# Patient Record
(demographics unavailable — no encounter records)

---

## 2024-11-12 NOTE — PLAN
[Provision of National Suicide Prevention Lifeline 6-225-319-TALK (7671)] : Provision of national suicide prevention lifeline 8-528-546-talk (8392) [Family] : family [Contact was Attempted] : contact was attempted [None on Record] : none on record [TextBox_9] : linkage to behavioral therapist with parent training. referral to peds neuro 12/2 12:30pm [place] [facility]. [place] given for school to fill out. secure email sent to school to inquire whether they run a social skills group.  [TextBox_11] : some psychopharm education about stimulants provided to the family.  [TextBox_13] :  no safety concerns at this time. no guns at home. family should call 911 or go to nearest ER or any acute safety concerns. [TextBox_26] : [email or web address] secure email sent to school. consent signed
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
## 2024-11-12 NOTE — REASON FOR VISIT
[Behavioral Health Urgent Care Assessment] : a behavioral health urgent care assessment [School] : school [Patient] : patient [Self] : alone [Parents] : with parents [TextBox_17] : behavioral issues

## 2024-12-03 NOTE — CONSULT LETTER
[Dear  ___] : Dear  [unfilled], [Consult Letter:] : I had the pleasure of evaluating your patient, [unfilled]. [Consult Closing:] : Thank you very much for allowing me to participate in the care of this patient.  If you have any questions, please do not hesitate to contact me. [Sincerely,] : Sincerely, [FreeTextEntry3] : YARI Collins-C Certified Family Nurse Practitioner Pediatric Neurology [facility] [address] [place] [address] Tel: [phone]. Fax: [phone]

## 2024-12-03 NOTE — ASSESSMENT
[FreeTextEntry1] :  DUSTIN is a 5 year old male presenting for initial evaluation of inattention/hyperactivity   DUSTIN is in an [employer]  classroom setting as a general education student with no services at this time. Parents and teachers are concerned with need for constant movement, inability to remain on or complete tasks, and difficulties with personal space. Oppositional behavior has been present at home and at school. No concerns for staring episodes, twitching, rapid eye blinking or seizure-like activity. Non focal neurological exam. Will proceed with ADHD evaluation using [place] forms, pending parent form, teacher form reviewed during today's visit.

## 2024-12-03 NOTE — PHYSICAL EXAM
[Well-appearing] : well-appearing [Normocephalic] : normocephalic [No dysmorphic facial features] : no dysmorphic facial features [Neck supple] : neck supple [Straight] : straight [No deformities] : no deformities [Alert] : alert [Well related, good eye contact] : well related, good eye contact [Conversant] : conversant [Normal speech and language] : normal speech and language [Follows instructions well] : follows instructions well [VFF] : VFF [Pupils reactive to light and accommodation] : pupils reactive to light and accommodation [Full extraocular movements] : full extraocular movements [Normal facial sensation to light touch] : normal facial sensation to light touch [No facial asymmetry or weakness] : no facial asymmetry or weakness [Gross hearing intact] : gross hearing intact [Equal palate elevation] : equal palate elevation [Good shoulder shrug] : good shoulder shrug [Normal tongue movement] : normal tongue movement [Midline tongue, no fasciculations] : midline tongue, no fasciculations [Normal axial and appendicular muscle tone] : normal axial and appendicular muscle tone [Gets up on table without difficulty] : gets up on table without difficulty [No pronator drift] : no pronator drift [Normal finger tapping and fine finger movements] : normal finger tapping and fine finger movements [No abnormal involuntary movements] : no abnormal involuntary movements [5/5 strength in proximal and distal muscles of arms and legs] : 5/5 strength in proximal and distal muscles of arms and legs [Walks and runs well] : walks and runs well [Able to do deep knee bend] : able to do deep knee bend [Able to walk on heels] : able to walk on heels [Able to walk on toes] : able to walk on toes [Knee jerks] : knee jerks [Localizes LT and temperature] : localizes LT and temperature [No dysmetria on FTNT] : no dysmetria on FTNT [Good walking balance] : good walking balance [Normal gait] : normal gait [Able to tandem well] : able to tandem well [Negative Romberg] : negative Romberg [de-identified] : Breathing even and unlabored

## 2024-12-03 NOTE — REASON FOR VISIT
[Initial Consultation] : an initial consultation for [Mother] : mother [FreeTextEntry2] : inattention, hyperactivity

## 2024-12-03 NOTE — PHYSICAL EXAM
[Well-appearing] : well-appearing [Normocephalic] : normocephalic [No dysmorphic facial features] : no dysmorphic facial features [Neck supple] : neck supple [Straight] : straight [No deformities] : no deformities [Alert] : alert [Well related, good eye contact] : well related, good eye contact [Conversant] : conversant [Normal speech and language] : normal speech and language [Follows instructions well] : follows instructions well [VFF] : VFF [Pupils reactive to light and accommodation] : pupils reactive to light and accommodation [Full extraocular movements] : full extraocular movements [Normal facial sensation to light touch] : normal facial sensation to light touch [No facial asymmetry or weakness] : no facial asymmetry or weakness [Gross hearing intact] : gross hearing intact [Equal palate elevation] : equal palate elevation [Good shoulder shrug] : good shoulder shrug [Normal tongue movement] : normal tongue movement [Midline tongue, no fasciculations] : midline tongue, no fasciculations [Normal axial and appendicular muscle tone] : normal axial and appendicular muscle tone [Gets up on table without difficulty] : gets up on table without difficulty [No pronator drift] : no pronator drift [Normal finger tapping and fine finger movements] : normal finger tapping and fine finger movements [No abnormal involuntary movements] : no abnormal involuntary movements [5/5 strength in proximal and distal muscles of arms and legs] : 5/5 strength in proximal and distal muscles of arms and legs [Walks and runs well] : walks and runs well [Able to do deep knee bend] : able to do deep knee bend [Able to walk on heels] : able to walk on heels [Able to walk on toes] : able to walk on toes [Knee jerks] : knee jerks [Localizes LT and temperature] : localizes LT and temperature [No dysmetria on FTNT] : no dysmetria on FTNT [Good walking balance] : good walking balance [Normal gait] : normal gait [Able to tandem well] : able to tandem well [Negative Romberg] : negative Romberg [de-identified] : Breathing even and unlabored
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
## 2024-12-03 NOTE — ASSESSMENT
[FreeTextEntry1] :  DUSTIN is a 5 year old male presenting for initial evaluation of inattention/hyperactivity   DUSTIN is in an [employer]  classroom setting as a general education student with no services at this time. Parents and teachers are concerned with need for constant movement, inability to remain on or complete tasks, and difficulties with personal space. Oppositional behavior has been present at home and at school. No concerns for staring episodes, twitching, rapid eye blinking or seizure-like activity. Non focal neurological exam. Will proceed with ADHD evaluation using [place] forms, pending parent form, teacher form reviewed during today's visit.

## 2024-12-03 NOTE — PLAN
[FreeTextEntry1] : [ ][place] questionnaires reviewed for teacher, pending parent form  [ ] Discussed use of Omega 3 fish oil [ ]Discussed use of medications as well as side effects if accommodations do not improve school performance [ ]Follow up once parent form is completed or obtained from  urgent care.  [ ] All questions and concerns addressed

## 2024-12-03 NOTE — CONSULT LETTER
[Dear  ___] : Dear  [unfilled], [Consult Letter:] : I had the pleasure of evaluating your patient, [unfilled]. [Consult Closing:] : Thank you very much for allowing me to participate in the care of this patient.  If you have any questions, please do not hesitate to contact me. [Sincerely,] : Sincerely, [FreeTextEntry3] : YARI Collins-C Certified Family Nurse Practitioner Pediatric Neurology [facility] [address] [place] [address] Tel: [phone]. Fax: [phone]

## 2024-12-03 NOTE — PLAN
[FreeTextEntry1] : [ ][place] questionnaires reviewed for teacher, pending parent form  [ ] Discussed use of Omega 3 fish oil [ ]Discussed use of medications as well as side effects if accommodations do not improve school performance [ ]Follow up once parent form is completed or obtained from  urgent care.  [ ] All questions and concerns addressed
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
## 2025-01-26 NOTE — ASSESSMENT
[FreeTextEntry1] :  DUSTIN is a 6 year old male presenting for f/u evaluation of inattention/hyperactivity   DUSTIN is in an [employer]  classroom setting as a general education student with no services at this time. Parents and teachers are concerned with need for constant movement, inability to remain on or complete tasks, and difficulties with personal space. Oppositional behavior has been present at home and at school. No concerns for staring episodes, twitching, rapid eye blinking or seizure-like activity. Non focal neurological exam. [place] forms reviewed, consistent with ADHD- Combined type with a component of ODD.

## 2025-01-26 NOTE — ASSESSMENT
[FreeTextEntry1] :  DUSTIN is a 6 year old male presenting for f/u evaluation of inattention/hyperactivity   DUSTIN is in an [employer]  classroom setting as a general education student with no services at this time. Parents and teachers are concerned with need for constant movement, inability to remain on or complete tasks, and difficulties with personal space. Oppositional behavior has been present at home and at school. No concerns for staring episodes, twitching, rapid eye blinking or seizure-like activity. Non focal neurological exam. [place] forms reviewed, consistent with ADHD- Combined type with a component of ODD.

## 2025-01-26 NOTE — PHYSICAL EXAM
[Well-appearing] : well-appearing [Normocephalic] : normocephalic [No dysmorphic facial features] : no dysmorphic facial features [Neck supple] : neck supple [Straight] : straight [No deformities] : no deformities [Alert] : alert [Well related, good eye contact] : well related, good eye contact [Conversant] : conversant [Normal speech and language] : normal speech and language [Follows instructions well] : follows instructions well [VFF] : VFF [Pupils reactive to light and accommodation] : pupils reactive to light and accommodation [Full extraocular movements] : full extraocular movements [Normal facial sensation to light touch] : normal facial sensation to light touch [No facial asymmetry or weakness] : no facial asymmetry or weakness [Gross hearing intact] : gross hearing intact [Equal palate elevation] : equal palate elevation [Good shoulder shrug] : good shoulder shrug [Normal tongue movement] : normal tongue movement [Midline tongue, no fasciculations] : midline tongue, no fasciculations [Normal axial and appendicular muscle tone] : normal axial and appendicular muscle tone [Gets up on table without difficulty] : gets up on table without difficulty [No pronator drift] : no pronator drift [Normal finger tapping and fine finger movements] : normal finger tapping and fine finger movements [No abnormal involuntary movements] : no abnormal involuntary movements [5/5 strength in proximal and distal muscles of arms and legs] : 5/5 strength in proximal and distal muscles of arms and legs [Walks and runs well] : walks and runs well [Able to do deep knee bend] : able to do deep knee bend [Able to walk on heels] : able to walk on heels [Able to walk on toes] : able to walk on toes [Knee jerks] : knee jerks [Localizes LT and temperature] : localizes LT and temperature [No dysmetria on FTNT] : no dysmetria on FTNT [Good walking balance] : good walking balance [Normal gait] : normal gait [Able to tandem well] : able to tandem well [Negative Romberg] : negative Romberg [de-identified] : Breathing even and unlabored

## 2025-01-26 NOTE — PHYSICAL EXAM
[Well-appearing] : well-appearing [Normocephalic] : normocephalic [No dysmorphic facial features] : no dysmorphic facial features [Neck supple] : neck supple [Straight] : straight [No deformities] : no deformities [Alert] : alert [Well related, good eye contact] : well related, good eye contact [Conversant] : conversant [Normal speech and language] : normal speech and language [Follows instructions well] : follows instructions well [VFF] : VFF [Pupils reactive to light and accommodation] : pupils reactive to light and accommodation [Full extraocular movements] : full extraocular movements [Normal facial sensation to light touch] : normal facial sensation to light touch [No facial asymmetry or weakness] : no facial asymmetry or weakness [Gross hearing intact] : gross hearing intact [Equal palate elevation] : equal palate elevation [Good shoulder shrug] : good shoulder shrug [Normal tongue movement] : normal tongue movement [Midline tongue, no fasciculations] : midline tongue, no fasciculations [Normal axial and appendicular muscle tone] : normal axial and appendicular muscle tone [Gets up on table without difficulty] : gets up on table without difficulty [No pronator drift] : no pronator drift [Normal finger tapping and fine finger movements] : normal finger tapping and fine finger movements [No abnormal involuntary movements] : no abnormal involuntary movements [5/5 strength in proximal and distal muscles of arms and legs] : 5/5 strength in proximal and distal muscles of arms and legs [Walks and runs well] : walks and runs well [Able to do deep knee bend] : able to do deep knee bend [Able to walk on heels] : able to walk on heels [Able to walk on toes] : able to walk on toes [Knee jerks] : knee jerks [Localizes LT and temperature] : localizes LT and temperature [No dysmetria on FTNT] : no dysmetria on FTNT [Good walking balance] : good walking balance [Normal gait] : normal gait [Able to tandem well] : able to tandem well [Negative Romberg] : negative Romberg [de-identified] : Breathing even and unlabored

## 2025-01-26 NOTE — CONSULT LETTER
[Dear  ___] : Dear  [unfilled], [Consult Letter:] : I had the pleasure of evaluating your patient, [unfilled]. [Consult Closing:] : Thank you very much for allowing me to participate in the care of this patient.  If you have any questions, please do not hesitate to contact me. [Sincerely,] : Sincerely, [FreeTextEntry3] : YARI oCllins-C Certified Family Nurse Practitioner Pediatric Neurology [facility] [address] [place] [address] Tel: [phone]. Fax: [phone]
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
## 2025-01-26 NOTE — CONSULT LETTER
[Dear  ___] : Dear  [unfilled], [Consult Letter:] : I had the pleasure of evaluating your patient, [unfilled]. [Consult Closing:] : Thank you very much for allowing me to participate in the care of this patient.  If you have any questions, please do not hesitate to contact me. [Sincerely,] : Sincerely, [FreeTextEntry3] : YARI Collins-C Certified Family Nurse Practitioner Pediatric Neurology [facility] [address] [place] [address] Tel: [phone]. Fax: [phone]

## 2025-01-26 NOTE — REASON FOR VISIT
[Follow-Up Evaluation] : a follow-up evaluation for [Mother] : mother [FreeTextEntry2] : inattention, hyperactivity

## 2025-01-26 NOTE — PLAN
[FreeTextEntry1] : [ ] Accommodations letter provided via email  [ ] Start Concerta 18 mg, side effects and refill process discussed  [ ] Discussed use of Omega 3 fish oil [ ] All questions and concerns addressed